# Patient Record
Sex: FEMALE | Race: WHITE | NOT HISPANIC OR LATINO | URBAN - METROPOLITAN AREA
[De-identification: names, ages, dates, MRNs, and addresses within clinical notes are randomized per-mention and may not be internally consistent; named-entity substitution may affect disease eponyms.]

---

## 2022-04-22 ENCOUNTER — APPOINTMENT (RX ONLY)
Dept: URBAN - METROPOLITAN AREA CLINIC 333 | Facility: CLINIC | Age: 37
Setting detail: DERMATOLOGY
End: 2022-04-22

## 2022-04-22 DIAGNOSIS — I83.9 ASYMPTOMATIC VARICOSE VEINS OF LOWER EXTREMITIES: ICD-10-CM

## 2022-04-22 DIAGNOSIS — L82.1 OTHER SEBORRHEIC KERATOSIS: ICD-10-CM | Status: STABLE

## 2022-04-22 PROBLEM — D23.72 OTHER BENIGN NEOPLASM OF SKIN OF LEFT LOWER LIMB, INCLUDING HIP: Status: ACTIVE | Noted: 2022-04-22

## 2022-04-22 PROBLEM — I83.90 ASYMPTOMATIC VARICOSE VEINS OF UNSPECIFIED LOWER EXTREMITY: Status: ACTIVE | Noted: 2022-04-22

## 2022-04-22 PROCEDURE — 99202 OFFICE O/P NEW SF 15 MIN: CPT

## 2022-04-22 PROCEDURE — ? COUNSELING

## 2022-04-22 PROCEDURE — ? ADDITIONAL NOTES

## 2022-04-22 ASSESSMENT — LOCATION ZONE DERM: LOCATION ZONE: FACE

## 2022-04-22 ASSESSMENT — LOCATION SIMPLE DESCRIPTION DERM: LOCATION SIMPLE: LEFT TEMPLE

## 2022-04-22 ASSESSMENT — LOCATION DETAILED DESCRIPTION DERM: LOCATION DETAILED: LEFT MID TEMPLE

## 2022-04-22 NOTE — HPI: SKIN LESIONS
Is This A New Presentation, Or A Follow-Up?: Skin Lesions
Additional History: \\n\\nBrown spot next to left eye \\nSkin lesion front of right ankle\\nPt ask questions about veins on both the legs

## 2022-04-22 NOTE — PROCEDURE: COUNSELING
Detail Level: Zone
Detail Level: Detailed
Patient Specific Counseling (Will Not Stick From Patient To Patient): Patient was referred to Dr. Paz

## 2025-01-07 ENCOUNTER — NEW PATIENT (OUTPATIENT)
Age: 40
End: 2025-01-07

## 2025-01-07 DIAGNOSIS — H40.013: ICD-10-CM

## 2025-01-07 DIAGNOSIS — H52.13: ICD-10-CM

## 2025-01-07 PROCEDURE — 92250 FUNDUS PHOTOGRAPHY W/I&R: CPT

## 2025-01-07 PROCEDURE — 92015 DETERMINE REFRACTIVE STATE: CPT

## 2025-01-07 PROCEDURE — 92004 COMPRE OPH EXAM NEW PT 1/>: CPT

## 2025-07-10 ENCOUNTER — PREPPED CHART (OUTPATIENT)
Age: 40
End: 2025-07-10